# Patient Record
Sex: FEMALE | Race: BLACK OR AFRICAN AMERICAN | NOT HISPANIC OR LATINO | Employment: STUDENT | URBAN - METROPOLITAN AREA
[De-identification: names, ages, dates, MRNs, and addresses within clinical notes are randomized per-mention and may not be internally consistent; named-entity substitution may affect disease eponyms.]

---

## 2019-01-10 ENCOUNTER — HOSPITAL ENCOUNTER (EMERGENCY)
Facility: OTHER | Age: 24
Discharge: HOME | End: 2019-01-10
Attending: EMERGENCY MEDICINE

## 2019-01-10 VITALS
RESPIRATION RATE: 18 BRPM | HEART RATE: 64 BPM | OXYGEN SATURATION: 99 % | DIASTOLIC BLOOD PRESSURE: 67 MMHG | SYSTOLIC BLOOD PRESSURE: 121 MMHG | BODY MASS INDEX: 21.19 KG/M2 | WEIGHT: 135 LBS | TEMPERATURE: 98 F | HEIGHT: 67 IN

## 2019-01-10 DIAGNOSIS — T19.2XXA FOREIGN BODY IN VAGINA, INITIAL ENCOUNTER: Primary | ICD-10-CM

## 2019-01-10 LAB
B-HCG UR QL: NEGATIVE
CTP QC/QA: YES

## 2019-01-10 PROCEDURE — 99284 EMERGENCY DEPT VISIT MOD MDM: CPT | Mod: 25

## 2019-01-10 PROCEDURE — 81025 URINE PREGNANCY TEST: CPT | Performed by: EMERGENCY MEDICINE

## 2019-01-10 NOTE — ED NOTES
Patient Identifiers for Mica Ayoub checked and correct  LOC: The patient is awake, alert and aware of environment with an appropriate affect, the patient is oriented x 3 and speaking appropriate.  APPEARANCE: Patient resting comfortably and in no acute distress. The patient is clean and well groomed. The patient's clothing is properly fastened.  SKIN: The skin is warm and dry. The patient has normal skin turgor and moist mucus membranes. No rashes or lesions upon observation. Skin Intact , no breakdown noted.  Musculoskeletal :  Normal range of motion noted. Moves all extremities well.  RESPIRATORY: Airway is open and patent, respirations are spontaneous, patient has a normal effort and rate.   ABDOMEN: Soft and non tender to palpation, no distention observed. Bowels Sounds are WNL all quads.  PULSES: 2+ radial pulses, symmetrical.  Will continue to monitor

## 2019-01-10 NOTE — ED TRIAGE NOTES
Pt reports intercourse last night, unable to find condom afterwards. Pt requests evaluation for dislodged condom. Pt denies dysuria, abnormal vaginal discharge or bleeding & abdominal & pelvic pain.

## 2019-01-10 NOTE — ED PROVIDER NOTES
"Encounter Date: 1/10/2019    SCRIBE #1 NOTE: I, Reji Luu , am scribing for, and in the presence of, Dr. Rome .       History     Chief Complaint   Patient presents with    Foreign Body in Vagina     Pt reports she had sex last night and they were unable to find the condom. Pt wants to check "to see if it is still inside me"     Time seen by provider: 8:43 AM    This is a 23 y.o. female who presents with complaint of possible foreign body in her vagina since last night. She reports having sexual intercourse with condoms last night and states they could not find the condom afterwards. Patient states the condom may still be inside of her vagina, and she is requesting an exam. She denies vaginal pain, vaginal discharge, or vaginal bleeding. She has no additional complaints. She denies significant past medical history, past surgical history, or use of daily prescription medications. She admits use of marijuana, but denies use of alcohol, tobacco, or other illicit drugs. NKDA.         The history is provided by the patient.     Review of patient's allergies indicates:  No Known Allergies  History reviewed. No pertinent past medical history.  History reviewed. No pertinent surgical history.  History reviewed. No pertinent family history.  Social History     Tobacco Use    Smoking status: Current Some Day Smoker    Smokeless tobacco: Never Used   Substance Use Topics    Alcohol use: Yes    Drug use: Yes     Types: Marijuana     Review of Systems   Constitutional: Negative for chills and fever.   HENT: Negative for congestion, rhinorrhea and sore throat.    Respiratory: Negative for cough and shortness of breath.    Cardiovascular: Negative for chest pain.   Gastrointestinal: Negative for abdominal pain, diarrhea, nausea and vomiting.   Genitourinary: Negative for dysuria, vaginal bleeding, vaginal discharge and vaginal pain.        Positive for possible foreign body.     Musculoskeletal: Negative for back pain. "   Skin: Negative for rash.   Neurological: Negative for dizziness and weakness.   Psychiatric/Behavioral: Negative for confusion.       Physical Exam     Initial Vitals [01/10/19 0737]   BP Pulse Resp Temp SpO2   137/65 66 18 98 °F (36.7 °C) 100 %      MAP       --         Physical Exam    Nursing note and vitals reviewed.  Constitutional: She appears well-developed and well-nourished. No distress.   HENT:   Head: Normocephalic and atraumatic.   Eyes: Conjunctivae and EOM are normal.   Neck: Normal range of motion. Neck supple.   Cardiovascular: Normal rate, regular rhythm, normal heart sounds and intact distal pulses.   Pulmonary/Chest: Breath sounds normal. No respiratory distress. She has no wheezes. She has no rhonchi. She has no rales.   Abdominal: Soft. She exhibits no distension. There is no tenderness.   Genitourinary:   Genitourinary Comments: Pelvic exam: Chaperone present. Condom removed from vagina.    Musculoskeletal: Normal range of motion.   Neurological: She is alert and oriented to person, place, and time. She has normal strength. No cranial nerve deficit.   Skin: Skin is warm and dry.   Psychiatric: She has a normal mood and affect. Her behavior is normal. Judgment and thought content normal.         ED Course   Procedures  Labs Reviewed   POCT URINE PREGNANCY          Imaging Results    None          Medical Decision Making:   Clinical Tests:   Lab Tests: Ordered and Reviewed            Scribe Attestation:   Scribe #1: I performed the above scribed service and the documentation accurately describes the services I performed. I attest to the accuracy of the note.    Attending Attestation:           Physician Attestation for Scribe:  Physician Attestation Statement for Scribe #1: I, Dr. Rome, reviewed documentation, as scribed by Reji Victoria  in my presence, and it is both accurate and complete.         Attending ED Notes:   Emergent evaluation a 23-year-old female with complaint of a condom that  came off in her vagina last night after intercourse.  Patient is afebrile, nontoxic-appearing stable vital signs.  No abdominal tenderness to palpation.  No adnexal or pelvic tenderness to palpation. Condom was removed without complication.  No clinical evidence of toxic shock syndrome.  The patient is extensively counseled on her diagnosis and treatment, discharged in good condition and directed to follow up with her gynecologist in the next 24-48 hours.              Clinical Impression:     1. Foreign body in vagina, initial encounter                                   Clemente Raymond MD  01/10/19 3271

## 2023-07-26 PROBLEM — S60.032A CONTUSION OF LEFT MIDDLE FINGER WITHOUT DAMAGE TO NAIL: Status: ACTIVE | Noted: 2023-07-26

## 2023-07-26 PROBLEM — S60.042A CONTUSION OF LEFT RING FINGER WITHOUT DAMAGE TO NAIL: Status: ACTIVE | Noted: 2023-07-26

## 2023-07-26 PROBLEM — S69.92XA HAND TRAUMA, LEFT, INITIAL ENCOUNTER: Status: ACTIVE | Noted: 2023-07-26
